# Patient Record
Sex: FEMALE | Race: BLACK OR AFRICAN AMERICAN | URBAN - METROPOLITAN AREA
[De-identification: names, ages, dates, MRNs, and addresses within clinical notes are randomized per-mention and may not be internally consistent; named-entity substitution may affect disease eponyms.]

---

## 2023-10-31 ENCOUNTER — APPOINTMENT (OUTPATIENT)
Dept: PEDIATRICS | Facility: CLINIC | Age: 33
End: 2023-10-31
Payer: COMMERCIAL

## 2024-05-14 ENCOUNTER — APPOINTMENT (OUTPATIENT)
Dept: PRIMARY CARE | Facility: CLINIC | Age: 34
End: 2024-05-14
Payer: COMMERCIAL

## 2024-06-27 ENCOUNTER — APPOINTMENT (OUTPATIENT)
Dept: OBSTETRICS AND GYNECOLOGY | Facility: CLINIC | Age: 34
End: 2024-06-27
Payer: COMMERCIAL

## 2025-02-12 ENCOUNTER — APPOINTMENT (OUTPATIENT)
Dept: PRIMARY CARE | Facility: CLINIC | Age: 35
End: 2025-02-12
Payer: COMMERCIAL

## 2025-02-13 ENCOUNTER — OFFICE VISIT (OUTPATIENT)
Dept: PRIMARY CARE | Facility: CLINIC | Age: 35
End: 2025-02-13
Payer: COMMERCIAL

## 2025-02-13 ENCOUNTER — APPOINTMENT (OUTPATIENT)
Dept: PRIMARY CARE | Facility: CLINIC | Age: 35
End: 2025-02-13
Payer: COMMERCIAL

## 2025-02-13 VITALS
TEMPERATURE: 97.5 F | SYSTOLIC BLOOD PRESSURE: 104 MMHG | DIASTOLIC BLOOD PRESSURE: 70 MMHG | WEIGHT: 200 LBS | HEART RATE: 91 BPM

## 2025-02-13 DIAGNOSIS — K13.0 ANGULAR CHEILITIS: ICD-10-CM

## 2025-02-13 DIAGNOSIS — E78.5 HYPERLIPIDEMIA, UNSPECIFIED HYPERLIPIDEMIA TYPE: ICD-10-CM

## 2025-02-13 DIAGNOSIS — Z76.89 ENCOUNTER TO ESTABLISH CARE: ICD-10-CM

## 2025-02-13 DIAGNOSIS — R94.6 ABNORMAL THYROID FUNCTION TEST: Primary | ICD-10-CM

## 2025-02-13 DIAGNOSIS — E55.9 VITAMIN D DEFICIENCY: ICD-10-CM

## 2025-02-13 DIAGNOSIS — F33.41 RECURRENT MAJOR DEPRESSIVE DISORDER, IN PARTIAL REMISSION (CMS-HCC): ICD-10-CM

## 2025-02-13 DIAGNOSIS — D50.9 IRON DEFICIENCY ANEMIA, UNSPECIFIED IRON DEFICIENCY ANEMIA TYPE: ICD-10-CM

## 2025-02-13 DIAGNOSIS — N05.0 MINIMAL CHANGE DISEASE: ICD-10-CM

## 2025-02-13 PROCEDURE — 1036F TOBACCO NON-USER: CPT | Performed by: NURSE PRACTITIONER

## 2025-02-13 PROCEDURE — 99214 OFFICE O/P EST MOD 30 MIN: CPT | Performed by: NURSE PRACTITIONER

## 2025-02-13 RX ORDER — IRON POLYSACCHARIDE COMPLEX 150 MG
1 CAPSULE ORAL EVERY OTHER DAY
COMMUNITY
Start: 2025-01-02

## 2025-02-13 RX ORDER — ESCITALOPRAM OXALATE 20 MG/1
20 TABLET ORAL DAILY
Qty: 90 TABLET | Refills: 3 | Status: SHIPPED | OUTPATIENT
Start: 2025-02-13 | End: 2026-02-08

## 2025-02-13 RX ORDER — FLUCONAZOLE 150 MG/1
150 TABLET ORAL ONCE
Qty: 1 TABLET | Refills: 0 | Status: SHIPPED | OUTPATIENT
Start: 2025-02-13 | End: 2025-02-13

## 2025-02-13 RX ORDER — TACROLIMUS 1 MG/1
6 CAPSULE ORAL DAILY
COMMUNITY

## 2025-02-13 RX ORDER — MUPIROCIN 20 MG/G
OINTMENT TOPICAL 3 TIMES DAILY
Qty: 22 G | Refills: 0 | Status: SHIPPED | OUTPATIENT
Start: 2025-02-13 | End: 2025-02-23

## 2025-02-13 RX ORDER — ACETAMINOPHEN 500 MG
4000 TABLET ORAL
COMMUNITY
Start: 2024-10-29 | End: 2025-10-29

## 2025-02-13 RX ORDER — ASCORBIC ACID 250 MG
1 TABLET ORAL EVERY OTHER DAY
COMMUNITY
Start: 2024-10-29 | End: 2025-04-27

## 2025-02-13 ASSESSMENT — PATIENT HEALTH QUESTIONNAIRE - PHQ9
2. FEELING DOWN, DEPRESSED OR HOPELESS: SEVERAL DAYS
SUM OF ALL RESPONSES TO PHQ9 QUESTIONS 1 AND 2: 1
1. LITTLE INTEREST OR PLEASURE IN DOING THINGS: NOT AT ALL
10. IF YOU CHECKED OFF ANY PROBLEMS, HOW DIFFICULT HAVE THESE PROBLEMS MADE IT FOR YOU TO DO YOUR WORK, TAKE CARE OF THINGS AT HOME, OR GET ALONG WITH OTHER PEOPLE: SOMEWHAT DIFFICULT

## 2025-02-13 ASSESSMENT — ENCOUNTER SYMPTOMS
COUGH: 0
CONSTIPATION: 0
DIARRHEA: 0
NAUSEA: 0
SHORTNESS OF BREATH: 0
VOMITING: 0
AGITATION: 0
FATIGUE: 0
FEVER: 0
SLEEP DISTURBANCE: 0
WEAKNESS: 0
COLOR CHANGE: 1
NERVOUS/ANXIOUS: 0

## 2025-02-13 NOTE — PROGRESS NOTES
Subjective   Patient ID: Stephanie Ricci is a 34 y.o. female who presents for New Patient Visit.    Her to establish care. She used to be on Lexapro which helped with her depression and she would like to try it again. She has a history of minimal change disease and was following with a nephrologist at [facility] for years but her insurance recently changed, she is hoping to be able to continue there with him.     She had some blood work in Oct 2024 and T4 was off, TSH was okay.     She has white discoloration to the corners of her mouth, she reports it did start out crusty but then turned white         Review of Systems   Constitutional:  Negative for fatigue and fever.   Respiratory:  Negative for cough and shortness of breath.    Cardiovascular:  Negative for chest pain and leg swelling.   Gastrointestinal:  Negative for constipation, diarrhea, nausea and vomiting.   Skin:  Positive for color change. Negative for pallor and rash.   Neurological:  Negative for weakness.   Psychiatric/Behavioral:  Negative for agitation, behavioral problems and sleep disturbance. The patient is not nervous/anxious.        Objective   /70   Pulse 91   Temp 36.4 °C (97.5 °F)   Wt 90.7 kg (200 lb)     Physical Exam  Vitals and nursing note reviewed.   Constitutional:       General: She is not in acute distress.  HENT:      Head: Normocephalic and atraumatic.   Eyes:      Pupils: Pupils are equal, round, and reactive to light.   Pulmonary:      Effort: Pulmonary effort is normal.   Skin:     General: Skin is warm and dry.      Comments: Bilateral corner of her mouth are white discoloration    Neurological:      Mental Status: She is alert and oriented to person, place, and time.   Psychiatric:         Mood and Affect: Mood normal.         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    Relevant Orders    Urinalysis with Reflex Microscopic    Albumin-Creatinine Ratio, Urine Random    Hemoglobin A1C    Vitamin D deficiency E55.9    Relevant Orders    Vitamin D 25-Hydroxy,Total (for eval of Vitamin D levels)    Recurrent major depressive disorder, in partial remission ([facility]) F33.41    Relevant Medications    escitalopram (Lexapro) 20 mg tablet    Angular cheilitis K13.0    Relevant Medications    fluconazole (Diflucan) 150 mg tablet    mupirocin (Bactroban) 2 % ointment    Other Relevant Orders    Referral to Dermatology     Other Visit Diagnoses         Codes    Encounter to establish care     Z76.89

## 2025-02-14 LAB
ALBUMIN/CREAT UR: 1817 MG/G CREAT
APPEARANCE UR: CLEAR
BACTERIA #/AREA URNS HPF: ABNORMAL /HPF
BILIRUB UR QL STRIP: NEGATIVE
COLOR UR: YELLOW
CREAT UR-MCNC: 122 MG/DL (ref 20–275)
GLUCOSE UR QL STRIP: NEGATIVE
HGB UR QL STRIP: NEGATIVE
HYALINE CASTS #/AREA URNS LPF: ABNORMAL /LPF
KETONES UR QL STRIP: NEGATIVE
LEUKOCYTE ESTERASE UR QL STRIP: NEGATIVE
MICROALBUMIN UR-MCNC: 221.7 MG/DL
NITRITE UR QL STRIP: NEGATIVE
PH UR STRIP: 6.5 [PH] (ref 5–8)
PROT UR QL STRIP: ABNORMAL
RBC #/AREA URNS HPF: ABNORMAL /HPF
SERVICE CMNT-IMP: ABNORMAL
SP GR UR STRIP: 1.03 (ref 1–1.03)
SQUAMOUS #/AREA URNS HPF: ABNORMAL /HPF
WBC #/AREA URNS HPF: ABNORMAL /HPF

## 2025-04-02 DIAGNOSIS — G47.09 OTHER INSOMNIA: Primary | ICD-10-CM

## 2025-04-02 RX ORDER — TRAZODONE HYDROCHLORIDE 50 MG/1
50 TABLET ORAL NIGHTLY PRN
Qty: 30 TABLET | Refills: 0 | Status: SHIPPED | OUTPATIENT
Start: 2025-04-02 | End: 2026-04-02

## 2025-04-24 LAB
25(OH)D3+25(OH)D2 SERPL-MCNC: 37 NG/ML (ref 30–100)
ALBUMIN SERPL-MCNC: 2.7 G/DL (ref 3.6–5.1)
ALP SERPL-CCNC: 64 U/L (ref 31–125)
ALT SERPL-CCNC: 9 U/L (ref 6–29)
ANION GAP SERPL CALCULATED.4IONS-SCNC: 7 MMOL/L (CALC) (ref 7–17)
AST SERPL-CCNC: 11 U/L (ref 10–30)
BILIRUB SERPL-MCNC: 0.4 MG/DL (ref 0.2–1.2)
BUN SERPL-MCNC: 13 MG/DL (ref 7–25)
CALCIUM SERPL-MCNC: 8.1 MG/DL (ref 8.6–10.2)
CHLORIDE SERPL-SCNC: 106 MMOL/L (ref 98–110)
CHOLEST SERPL-MCNC: 274 MG/DL
CHOLEST/HDLC SERPL: 4.2 (CALC)
CO2 SERPL-SCNC: 25 MMOL/L (ref 20–32)
CREAT SERPL-MCNC: 0.41 MG/DL (ref 0.5–0.97)
EGFRCR SERPLBLD CKD-EPI 2021: 132 ML/MIN/1.73M2
ERYTHROCYTE [DISTWIDTH] IN BLOOD BY AUTOMATED COUNT: 14.4 % (ref 11–15)
EST. AVERAGE GLUCOSE BLD GHB EST-MCNC: 105 MG/DL
EST. AVERAGE GLUCOSE BLD GHB EST-SCNC: 5.8 MMOL/L
GLUCOSE SERPL-MCNC: 91 MG/DL (ref 65–99)
HBA1C MFR BLD: 5.3 %
HCT VFR BLD AUTO: 38 % (ref 35–45)
HDLC SERPL-MCNC: 66 MG/DL
HGB BLD-MCNC: 11.4 G/DL (ref 11.7–15.5)
IRON SATN MFR SERPL: 49 % (CALC) (ref 16–45)
IRON SERPL-MCNC: 115 MCG/DL (ref 40–190)
LDLC SERPL CALC-MCNC: 194 MG/DL (CALC)
MCH RBC QN AUTO: 22.5 PG (ref 27–33)
MCHC RBC AUTO-ENTMCNC: 30 G/DL (ref 32–36)
MCV RBC AUTO: 75 FL (ref 80–100)
NONHDLC SERPL-MCNC: 208 MG/DL (CALC)
PLATELET # BLD AUTO: 259 THOUSAND/UL (ref 140–400)
PMV BLD REES-ECKER: ABNORMAL FL
POTASSIUM SERPL-SCNC: 4.2 MMOL/L (ref 3.5–5.3)
PROT SERPL-MCNC: 5.2 G/DL (ref 6.1–8.1)
RBC # BLD AUTO: 5.07 MILLION/UL (ref 3.8–5.1)
SODIUM SERPL-SCNC: 138 MMOL/L (ref 135–146)
T3 SERPL-MCNC: 87 NG/DL (ref 76–181)
TIBC SERPL-MCNC: 235 MCG/DL (CALC) (ref 250–450)
TRIGL SERPL-MCNC: 50 MG/DL
TSH SERPL-ACNC: 1.37 MIU/L
WBC # BLD AUTO: 4.7 THOUSAND/UL (ref 3.8–10.8)

## 2025-04-25 LAB — THYROPEROXIDASE AB SERPL-ACNC: <1 IU/ML

## 2025-05-05 DIAGNOSIS — F33.41 RECURRENT MAJOR DEPRESSIVE DISORDER, IN PARTIAL REMISSION (CMS-HCC): ICD-10-CM

## 2025-05-05 RX ORDER — ESCITALOPRAM OXALATE 10 MG/1
20 TABLET ORAL DAILY
Qty: 90 TABLET | Refills: 3 | Status: SHIPPED | OUTPATIENT
Start: 2025-05-05 | End: 2026-04-30

## 2025-05-22 ENCOUNTER — ANCILLARY PROCEDURE (OUTPATIENT)
Dept: URGENT CARE | Age: 35
End: 2025-05-22
Payer: COMMERCIAL

## 2025-05-22 ENCOUNTER — OFFICE VISIT (OUTPATIENT)
Dept: URGENT CARE | Age: 35
End: 2025-05-22
Payer: COMMERCIAL

## 2025-05-22 VITALS
HEART RATE: 75 BPM | OXYGEN SATURATION: 99 % | DIASTOLIC BLOOD PRESSURE: 85 MMHG | TEMPERATURE: 98.3 F | RESPIRATION RATE: 15 BRPM | SYSTOLIC BLOOD PRESSURE: 135 MMHG | WEIGHT: 200 LBS

## 2025-05-22 DIAGNOSIS — S49.91XA INJURY OF RIGHT SHOULDER, INITIAL ENCOUNTER: Primary | ICD-10-CM

## 2025-05-22 DIAGNOSIS — S49.91XA INJURY OF RIGHT SHOULDER, INITIAL ENCOUNTER: ICD-10-CM
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
## 2025-05-22 PROCEDURE — 73030 X-RAY EXAM OF SHOULDER: CPT | Mod: RIGHT SIDE

## 2025-05-22 RX ORDER — ADAPALENE GEL USP, 0.3% 3 MG/G
GEL TOPICAL
COMMUNITY
Start: 2025-03-12

## 2025-05-22 RX ORDER — CLINDAMYCIN PHOSPHATE 10 MG/G
GEL TOPICAL
COMMUNITY
Start: 2025-03-12

## 2025-05-22 RX ORDER — CYCLOBENZAPRINE HCL 5 MG
5 TABLET ORAL 3 TIMES DAILY PRN
Qty: 20 TABLET | Refills: 0 | Status: SHIPPED | OUTPATIENT
Start: 2025-05-22

## 2025-05-22 RX ORDER — METHYLPREDNISOLONE 4 MG/1
TABLET ORAL
Qty: 21 TABLET | Refills: 0 | Status: SHIPPED | OUTPATIENT
Start: 2025-05-22 | End: 2025-05-28

## 2025-05-22 RX ORDER — ASCORBIC ACID 250 MG
1 TABLET ORAL EVERY OTHER DAY
COMMUNITY
Start: 2025-02-23

## 2025-05-22 ASSESSMENT — ENCOUNTER SYMPTOMS
COUGH: 0
CARDIOVASCULAR NEGATIVE: 1
GASTROINTESTINAL NEGATIVE: 1
CONSTITUTIONAL NEGATIVE: 1
NAUSEA: 0
SHORTNESS OF BREATH: 0
WHEEZING: 0
NEUROLOGICAL NEGATIVE: 1
ABDOMINAL PAIN: 0
ARTHRALGIAS: 1
VOMITING: 0
RESPIRATORY NEGATIVE: 1
DIARRHEA: 0
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
## 2025-05-22 NOTE — LETTER
May 22, 2025     Patient: Stephanie Ricci   YOB: 1990   Date of Visit: 5/22/2025       To Whom It May Concern:    Stephanie Ricci was seen in my clinic on 5/22/2025 at 10:15 am. Please excuse Stephanie for her absence from work on this day to make the appointment.    If you have any questions or concerns, please don't hesitate to call.         Sincerely,         LAURA Herndon-CNP        CC: No Recipients

## 2025-08-08 ENCOUNTER — OFFICE VISIT (OUTPATIENT)
Dept: URGENT CARE | Age: 35
End: 2025-08-08
Payer: COMMERCIAL

## 2025-08-08 VITALS
SYSTOLIC BLOOD PRESSURE: 123 MMHG | HEIGHT: 67 IN | BODY MASS INDEX: 32.49 KG/M2 | WEIGHT: 207 LBS | HEART RATE: 90 BPM | OXYGEN SATURATION: 97 % | RESPIRATION RATE: 17 BRPM | DIASTOLIC BLOOD PRESSURE: 79 MMHG | TEMPERATURE: 98.5 F

## 2025-08-08 DIAGNOSIS — M67.922 TENDINOPATHY OF LEFT ELBOW: Primary | ICD-10-CM

## 2025-08-08 RX ORDER — PREDNISONE 20 MG/1
40 TABLET ORAL DAILY
Qty: 10 TABLET | Refills: 0 | Status: SHIPPED | OUTPATIENT
Start: 2025-08-08 | End: 2025-08-13

## 2025-08-08 ASSESSMENT — PAIN SCALES - GENERAL: PAINLEVEL_OUTOF10: 8

## 2025-08-08 NOTE — PROGRESS NOTES
"Subjective   Patient ID: Stephanie Ricci is a 35 y.o. female. They present today with a chief complaint of Injury (Left elbow pain - woke up Thursday morning with pain - worked out upper body.  Today, the elbow pain is worse so she worked the lower body.  Pain to extend, abduct and raise overhead).      History of Present Illness  Subjective  Stephanie Ricci is a 35 y.o. female who presents with left elbow pain. Onset of the symptoms was yesterday. Inciting event: none known. Pt states she awoke yesterday morning with some pain and stiffness; she worked out yesterday (upper body lifting) and that pain has now worsened. Current symptoms include: point tenderness over lateral epicondyle. Pain is aggravated by: lifting heavy objects. Symptoms have gradually worsened. Patient has had no prior elbow problems. Evaluation to date: none. Treatment to date: avoidance of offending activity.    Objective  /79 (BP Location: Right arm, Patient Position: Sitting)   Pulse 90   Temp 36.9 °C (98.5 °F)   Resp 17   Ht 1.702 m (5' 7\")   Wt 93.9 kg (207 lb)   LMP 07/15/2025 (Approximate)   SpO2 97%   BMI 32.42 kg/m²   Right elbow: without deformity and full active ROM  Left elbow:  without deformity and tenderness over lateral epicondyle    Assessment/Plan  left lateral epicondylitis  Natural history and expected course discussed. Questions answered.  Rest, ice, compression, and elevation (RICE) therapy.  Reduction in offending activity.  OTC analgesics as needed.        History provided by:  Patient and medical records  Injury        Past Medical History  Allergies as of 08/08/2025 - Reviewed 08/08/2025   Allergen Reaction Noted    Tuberculin Rash 07/22/2015       Prescriptions Prior to Admission[1]     Current Medications[2]    Problem List[3]    Surgical History[4]     reports that she has never smoked. She has never used smokeless tobacco. She reports that she does not drink alcohol and does not use drugs.    Review of " "Systems  As noted in HPI. ROS otherwise negative unless noted.       Objective    Vitals:    08/08/25 1603   BP: 123/79   BP Location: Right arm   Patient Position: Sitting   Pulse: 90   Resp: 17   Temp: 36.9 °C (98.5 °F)   SpO2: 97%   Weight: 93.9 kg (207 lb)   Height: 1.702 m (5' 7\")     Patient's last menstrual period was 07/15/2025 (approximate).    Physical Exam  Vitals and nursing note reviewed.   Constitutional:       General: She is not in acute distress.     Appearance: Normal appearance. She is ill-appearing.   HENT:      Head: Normocephalic and atraumatic.   Abdominal:      General: Bowel sounds are normal.     Musculoskeletal:         General: Tenderness present.      Right shoulder: Normal.      Left shoulder: Normal.      Right upper arm: Normal.      Left upper arm: Normal.      Right elbow: Normal. Normal range of motion. No tenderness.      Left elbow: No swelling or deformity. Decreased range of motion. Tenderness present in lateral epicondyle.      Cervical back: Normal range of motion and neck supple. No rigidity or tenderness.      Comments: Localized tenderness over the lateral epicondyle. Pain with resisted wrist extension and passive terminal wrist flexion with the elbow in full extension.      Skin:     General: Skin is warm and dry.      Capillary Refill: Capillary refill takes less than 2 seconds.     Neurological:      Mental Status: She is alert and oriented to person, place, and time.     Psychiatric:         Behavior: Behavior normal.           Procedures    Point of Care Test & Imaging Results from this visit  Results for orders placed or performed in visit on 02/13/25   Urinalysis with Reflex Microscopic    Collection Time: 02/13/25  2:20 PM   Result Value Ref Range    COLOR YELLOW YELLOW    APPEARANCE CLEAR CLEAR    SPECIFIC GRAVITY 1.029 1.001 - 1.035    PH 6.5 5.0 - 8.0    GLUCOSE NEGATIVE NEGATIVE    BILIRUBIN NEGATIVE NEGATIVE    KETONES NEGATIVE NEGATIVE    OCCULT BLOOD " NEGATIVE NEGATIVE    PROTEIN 3+ (A) NEGATIVE    NITRITE NEGATIVE NEGATIVE    LEUKOCYTE ESTERASE NEGATIVE NEGATIVE    WBC 0-5 < OR = 5 /HPF    RBC 0-2 < OR = 2 /HPF    SQUAMOUS EPITHELIAL CELLS 0-5 < OR = 5 /HPF    BACTERIA NONE SEEN NONE SEEN /HPF    HYALINE CAST NONE SEEN NONE SEEN /LPF    NOTE     Albumin-Creatinine Ratio, Urine Random    Collection Time: 02/13/25  2:20 PM   Result Value Ref Range    CREATININE, RANDOM URINE 122 20 - 275 mg/dL    ALBUMIN, URINE 221.7 See Note: mg/dL    ALBUMIN/CREATININE RATIO, RANDOM URINE 1,817 (H) <30 mg/g creat   Comprehensive metabolic panel    Collection Time: 04/22/25 11:23 AM   Result Value Ref Range    GLUCOSE 91 65 - 99 mg/dL    UREA NITROGEN (BUN) 13 7 - 25 mg/dL    CREATININE 0.41 (L) 0.50 - 0.97 mg/dL    EGFR 132 > OR = 60 mL/min/1.73m2    SODIUM 138 135 - 146 mmol/L    POTASSIUM 4.2 3.5 - 5.3 mmol/L    CHLORIDE 106 98 - 110 mmol/L    CARBON DIOXIDE 25 20 - 32 mmol/L    ELECTROLYTE BALANCE 7 7 - 17 mmol/L (calc)    CALCIUM 8.1 (L) 8.6 - 10.2 mg/dL    PROTEIN, TOTAL 5.2 (L) 6.1 - 8.1 g/dL    ALBUMIN 2.7 (L) 3.6 - 5.1 g/dL    BILIRUBIN, TOTAL 0.4 0.2 - 1.2 mg/dL    ALKALINE PHOSPHATASE 64 31 - 125 U/L    AST 11 10 - 30 U/L    ALT 9 6 - 29 U/L   Hemoglobin A1C    Collection Time: 04/22/25 11:23 AM   Result Value Ref Range    HEMOGLOBIN A1c 5.3 <5.7 %    eAG (mg/dL) 105 mg/dL    eAG (mmol/L) 5.8 mmol/L   CBC    Collection Time: 04/22/25 11:23 AM   Result Value Ref Range    WHITE BLOOD CELL COUNT 4.7 3.8 - 10.8 Thousand/uL    RED BLOOD CELL COUNT 5.07 3.80 - 5.10 Million/uL    HEMOGLOBIN 11.4 (L) 11.7 - 15.5 g/dL    HEMATOCRIT 38.0 35.0 - 45.0 %    MCV 75.0 (L) 80.0 - 100.0 fL    MCH 22.5 (L) 27.0 - 33.0 pg    MCHC 30.0 (L) 32.0 - 36.0 g/dL    RDW 14.4 11.0 - 15.0 %    PLATELET COUNT 259 140 - 400 Thousand/uL    MPV     TSH with reflex to Free T4 if abnormal    Collection Time: 04/22/25 11:23 AM   Result Value Ref Range    TSH W/REFLEX TO FT4 1.37 mIU/L   Vitamin D  25-Hydroxy,Total (for eval of Vitamin D levels)    Collection Time: 04/22/25 11:23 AM   Result Value Ref Range    VITAMIN D,25-OH,TOTAL,IA 37 30 - 100 ng/mL   T3    Collection Time: 04/22/25 11:23 AM   Result Value Ref Range    T3, TOTAL 87 76 - 181 ng/dL   Iron and TIBC    Collection Time: 04/22/25 11:23 AM   Result Value Ref Range    IRON, TOTAL 115 40 - 190 mcg/dL    IRON BINDING CAPACITY 235 (L) 250 - 450 mcg/dL (calc)    % SATURATION 49 (H) 16 - 45 % (calc)   Lipid Panel    Collection Time: 04/22/25 11:23 AM   Result Value Ref Range    CHOLESTEROL, TOTAL 274 (H) <200 mg/dL    HDL CHOLESTEROL 66 > OR = 50 mg/dL    TRIGLYCERIDES 50 <150 mg/dL    LDL-CHOLESTEROL 194 (H) mg/dL (calc)    CHOL/HDLC RATIO 4.2 <5.0 (calc)    NON HDL CHOLESTEROL 208 (H) <130 mg/dL (calc)   Thyroid Peroxidase (TPO) Antibody    Collection Time: 04/22/25 11:26 AM   Result Value Ref Range    THYROID PEROXIDASE ANTIBODIES <1 <9 IU/mL        Diagnostic study results (if any) were reviewed.    Imaging  No results found.    Cardiology, Vascular, and Other Imaging  No other imaging results found for the past 2 days    Assessment/Plan   Allergies, medications, history, and pertinent labs/EKGs/Imaging reviewed.        Medical Decision Making  Risks, benefits, and alternatives of the medications and treatment plan prescribed today were discussed, and patient expressed understanding. Plan follow up as discussed or as needed if any worsening symptoms or change in condition. Reinforced red flags including (but not limited to): severe or worsening pain; difficulty swallowing; stiff neck; shortness of breath; coughing or vomiting blood; chest pain; and new or increased fever are indications to go to the Emergency Department.  At time of discharge patient was clinically well-appearing and HDS for outpatient management. The patient and/or family was educated regarding diagnosis, supportive care, OTC and Rx medications. The patient and/or family was given  the opportunity to ask questions prior to discharge.  They verbalized understanding of my discussion of the plans for treatment, expected course, indications to return to  or seek further evaluation in ED, and the need for timely follow up as directed.   They were provided with a work/school excuse if requested. The after-visit summary was given to the patient and care instructions were reviewed with the patient. All questions were answered and the patient verbalized understanding of the plan of care for today.  Plan:  Recent visit notes reviewed  Meds as above  Increase clear fluids  Pcp follow up this week if not improving or worsening  ER visit anytime 24/7 for acute worsening or changing condition    Orders and Diagnoses  Diagnoses and all orders for this visit:  Tendinopathy of left elbow  -     predniSONE (Deltasone) 20 mg tablet; Take 2 tablets (40 mg) by mouth once daily for 5 days.      Medical Admin Record      Follow Up Instructions  No follow-ups on file.    Patient disposition: Home  JENNYFER Herndon           [1] (Not in a hospital admission)   [2]   Current Outpatient Medications   Medication Sig Dispense Refill    adapalene (Differin) 0.3 % gel APPLY A PEA SIZED AMOUNT TO AFFECTED AREAS ON THE FACE EVERY NIGHT      ascorbic acid (Vitamin C) 250 mg tablet Take 1 tablet by mouth every other day.      cholecalciferol (Vitamin D-3) 50 mcg (2,000 unit) capsule Take 2 capsules (100 mcg) by mouth once daily.      clindamycin (Cleocin T) 1 % gel APPLY TO ENTIRE FACE EVERY MORNING      cyclobenzaprine (Flexeril) 5 mg tablet Take 1 tablet (5 mg) by mouth 3 times a day as needed for muscle spasms. 20 tablet 0    escitalopram (Lexapro) 10 mg tablet Take 2 tablets (20 mg) by mouth once daily. 90 tablet 3    iron polysaccharides (Nu-Iron,Niferex) 150 mg iron capsule Take 1 capsule (150 mg) by mouth every other day.      predniSONE (Deltasone) 20 mg tablet Take 2 tablets (40 mg) by mouth once daily  for 5 days. 10 tablet 0    semaglutide (Ozempic) 0.25 mg or 0.5 mg(2 mg/1.5 mL) pen injector Inject 0.25 mg under the skin 1 (one) time per week.      tacrolimus (Prograf) 1 mg capsule Take 6 capsules (6 mg) by mouth once daily.      traZODone (Desyrel) 50 mg tablet Take 1 tablet (50 mg) by mouth as needed at bedtime for sleep. 30 tablet 0     No current facility-administered medications for this visit.   [3]   Patient Active Problem List  Diagnosis    Abnormal thyroid function test    Hyperlipidemia    Iron deficiency anemia    Minimal change disease    Vitamin D deficiency    Recurrent major depressive disorder, in partial remission    Angular cheilitis   [4] No past surgical history on file.